# Patient Record
Sex: FEMALE | Race: WHITE | Employment: UNEMPLOYED | URBAN - METROPOLITAN AREA
[De-identification: names, ages, dates, MRNs, and addresses within clinical notes are randomized per-mention and may not be internally consistent; named-entity substitution may affect disease eponyms.]

---

## 2018-07-12 ENCOUNTER — HOSPITAL ENCOUNTER (OUTPATIENT)
Age: 28
Discharge: HOME | End: 2018-07-14
Payer: MEDICAID

## 2018-07-12 PROCEDURE — 88305 TISSUE EXAM BY PATHOLOGIST: CPT

## 2020-02-25 ENCOUNTER — APPOINTMENT (OUTPATIENT)
Dept: ULTRASOUND IMAGING | Age: 30
End: 2020-02-25
Payer: COMMERCIAL

## 2020-02-25 ENCOUNTER — HOSPITAL ENCOUNTER (EMERGENCY)
Age: 30
Discharge: ANOTHER HOSPITAL | End: 2020-02-26
Attending: EMERGENCY MEDICINE
Payer: COMMERCIAL

## 2020-02-25 ENCOUNTER — APPOINTMENT (OUTPATIENT)
Dept: CT IMAGING | Age: 30
End: 2020-02-25
Payer: COMMERCIAL

## 2020-02-25 LAB
ALBUMIN SERPL-MCNC: 3.1 G/DL (ref 3.5–5.2)
ALP BLD-CCNC: 59 U/L (ref 35–104)
ALT SERPL-CCNC: 32 U/L (ref 0–32)
ANION GAP SERPL CALCULATED.3IONS-SCNC: 12 MMOL/L (ref 7–16)
APTT: 26.9 SEC (ref 24.5–35.1)
AST SERPL-CCNC: 28 U/L (ref 0–31)
BACTERIA: ABNORMAL /HPF
BASOPHILS ABSOLUTE: 0.08 E9/L (ref 0–0.2)
BASOPHILS RELATIVE PERCENT: 0.4 % (ref 0–2)
BILIRUB SERPL-MCNC: 0.8 MG/DL (ref 0–1.2)
BILIRUBIN URINE: ABNORMAL
BLOOD, URINE: NEGATIVE
BUN BLDV-MCNC: 11 MG/DL (ref 6–20)
CALCIUM SERPL-MCNC: 7.7 MG/DL (ref 8.6–10.2)
CHLORIDE BLD-SCNC: 102 MMOL/L (ref 98–107)
CLARITY: ABNORMAL
CO2: 19 MMOL/L (ref 22–29)
COLOR: ABNORMAL
CREAT SERPL-MCNC: 0.7 MG/DL (ref 0.5–1)
D DIMER: 567 NG/ML DDU
EOSINOPHILS ABSOLUTE: 0.04 E9/L (ref 0.05–0.5)
EOSINOPHILS RELATIVE PERCENT: 0.2 % (ref 0–6)
GFR AFRICAN AMERICAN: >60
GFR NON-AFRICAN AMERICAN: >60 ML/MIN/1.73
GLUCOSE BLD-MCNC: 91 MG/DL (ref 74–99)
GLUCOSE URINE: NEGATIVE MG/DL
GONADOTROPIN, CHORIONIC (HCG) QUANT: 256.1 MIU/ML
HCG, URINE, POC: POSITIVE
HCT VFR BLD CALC: 41.7 % (ref 34–48)
HEMOGLOBIN: 13.9 G/DL (ref 11.5–15.5)
IMMATURE GRANULOCYTES #: 0.15 E9/L
IMMATURE GRANULOCYTES %: 0.7 % (ref 0–5)
INR BLD: 1.1
KETONES, URINE: 40 MG/DL
LEUKOCYTE ESTERASE, URINE: ABNORMAL
LYMPHOCYTES ABSOLUTE: 4.69 E9/L (ref 1.5–4)
LYMPHOCYTES RELATIVE PERCENT: 21.2 % (ref 20–42)
Lab: NORMAL
MCH RBC QN AUTO: 29.8 PG (ref 26–35)
MCHC RBC AUTO-ENTMCNC: 33.3 % (ref 32–34.5)
MCV RBC AUTO: 89.5 FL (ref 80–99.9)
MONOCYTES ABSOLUTE: 1.37 E9/L (ref 0.1–0.95)
MONOCYTES RELATIVE PERCENT: 6.2 % (ref 2–12)
NEGATIVE QC PASS/FAIL: NORMAL
NEUTROPHILS ABSOLUTE: 15.76 E9/L (ref 1.8–7.3)
NEUTROPHILS RELATIVE PERCENT: 71.3 % (ref 43–80)
NITRITE, URINE: NEGATIVE
PDW BLD-RTO: 12.8 FL (ref 11.5–15)
PH UA: 6 (ref 5–9)
PLATELET # BLD: 508 E9/L (ref 130–450)
PMV BLD AUTO: 8.7 FL (ref 7–12)
POSITIVE QC PASS/FAIL: NORMAL
POTASSIUM REFLEX MAGNESIUM: 4.2 MMOL/L (ref 3.5–5)
PROTEIN UA: NEGATIVE MG/DL
PROTHROMBIN TIME: 12.5 SEC (ref 9.3–12.4)
RBC # BLD: 4.66 E12/L (ref 3.5–5.5)
RBC UA: ABNORMAL /HPF (ref 0–2)
SODIUM BLD-SCNC: 133 MMOL/L (ref 132–146)
SPECIFIC GRAVITY UA: >=1.03 (ref 1–1.03)
TOTAL PROTEIN: 5.1 G/DL (ref 6.4–8.3)
TROPONIN: <0.01 NG/ML (ref 0–0.03)
UROBILINOGEN, URINE: 4 E.U./DL
WBC # BLD: 22.1 E9/L (ref 4.5–11.5)
WBC UA: ABNORMAL /HPF (ref 0–5)

## 2020-02-25 PROCEDURE — 96374 THER/PROPH/DIAG INJ IV PUSH: CPT

## 2020-02-25 PROCEDURE — 85610 PROTHROMBIN TIME: CPT

## 2020-02-25 PROCEDURE — 84702 CHORIONIC GONADOTROPIN TEST: CPT

## 2020-02-25 PROCEDURE — 36415 COLL VENOUS BLD VENIPUNCTURE: CPT

## 2020-02-25 PROCEDURE — 85378 FIBRIN DEGRADE SEMIQUANT: CPT

## 2020-02-25 PROCEDURE — 6360000002 HC RX W HCPCS: Performed by: EMERGENCY MEDICINE

## 2020-02-25 PROCEDURE — 6360000004 HC RX CONTRAST MEDICATION: Performed by: RADIOLOGY

## 2020-02-25 PROCEDURE — 85730 THROMBOPLASTIN TIME PARTIAL: CPT

## 2020-02-25 PROCEDURE — 85025 COMPLETE CBC W/AUTO DIFF WBC: CPT

## 2020-02-25 PROCEDURE — 99285 EMERGENCY DEPT VISIT HI MDM: CPT

## 2020-02-25 PROCEDURE — 81001 URINALYSIS AUTO W/SCOPE: CPT

## 2020-02-25 PROCEDURE — 71275 CT ANGIOGRAPHY CHEST: CPT

## 2020-02-25 PROCEDURE — 93005 ELECTROCARDIOGRAM TRACING: CPT | Performed by: EMERGENCY MEDICINE

## 2020-02-25 PROCEDURE — 2500000003 HC RX 250 WO HCPCS: Performed by: EMERGENCY MEDICINE

## 2020-02-25 PROCEDURE — 76700 US EXAM ABDOM COMPLETE: CPT

## 2020-02-25 PROCEDURE — 80053 COMPREHEN METABOLIC PANEL: CPT

## 2020-02-25 PROCEDURE — 96375 TX/PRO/DX INJ NEW DRUG ADDON: CPT

## 2020-02-25 PROCEDURE — 84484 ASSAY OF TROPONIN QUANT: CPT

## 2020-02-25 RX ORDER — ONDANSETRON 2 MG/ML
4 INJECTION INTRAMUSCULAR; INTRAVENOUS ONCE
Status: COMPLETED | OUTPATIENT
Start: 2020-02-25 | End: 2020-02-25

## 2020-02-25 RX ORDER — POTASSIUM CHLORIDE, DEXTROSE MONOHYDRATE AND SODIUM CHLORIDE 300; 5; 900 MG/100ML; G/100ML; MG/100ML
INJECTION, SOLUTION INTRAVENOUS CONTINUOUS
Status: DISCONTINUED | OUTPATIENT
Start: 2020-02-25 | End: 2020-02-26 | Stop reason: HOSPADM

## 2020-02-25 RX ORDER — FENTANYL CITRATE 50 UG/ML
50 INJECTION, SOLUTION INTRAMUSCULAR; INTRAVENOUS ONCE
Status: COMPLETED | OUTPATIENT
Start: 2020-02-25 | End: 2020-02-25

## 2020-02-25 RX ADMIN — FENTANYL CITRATE 50 MCG: 50 INJECTION, SOLUTION INTRAMUSCULAR; INTRAVENOUS at 22:13

## 2020-02-25 RX ADMIN — HYDROMORPHONE HYDROCHLORIDE 1 MG: 1 INJECTION, SOLUTION INTRAMUSCULAR; INTRAVENOUS; SUBCUTANEOUS at 23:55

## 2020-02-25 RX ADMIN — IOPAMIDOL 70 ML: 755 INJECTION, SOLUTION INTRAVENOUS at 20:27

## 2020-02-25 RX ADMIN — DEXTROSE MONOHYDRATE, SODIUM CHLORIDE, AND POTASSIUM CHLORIDE: 50; 9; 2.98 INJECTION, SOLUTION INTRAVENOUS at 23:42

## 2020-02-25 RX ADMIN — ONDANSETRON 4 MG: 2 INJECTION INTRAMUSCULAR; INTRAVENOUS at 22:13

## 2020-02-25 ASSESSMENT — PAIN DESCRIPTION - LOCATION: LOCATION: CHEST

## 2020-02-25 ASSESSMENT — PAIN DESCRIPTION - ONSET: ONSET: SUDDEN

## 2020-02-25 ASSESSMENT — PAIN SCALES - GENERAL
PAINLEVEL_OUTOF10: 7

## 2020-02-25 ASSESSMENT — PAIN DESCRIPTION - FREQUENCY: FREQUENCY: CONTINUOUS

## 2020-02-25 ASSESSMENT — PAIN DESCRIPTION - PAIN TYPE: TYPE: ACUTE PAIN

## 2020-02-25 ASSESSMENT — PAIN DESCRIPTION - PROGRESSION: CLINICAL_PROGRESSION: GRADUALLY IMPROVING

## 2020-02-25 ASSESSMENT — PAIN - FUNCTIONAL ASSESSMENT: PAIN_FUNCTIONAL_ASSESSMENT: PREVENTS OR INTERFERES SOME ACTIVE ACTIVITIES AND ADLS

## 2020-02-25 ASSESSMENT — PAIN DESCRIPTION - DESCRIPTORS: DESCRIPTORS: STABBING

## 2020-02-25 ASSESSMENT — PAIN DESCRIPTION - ORIENTATION: ORIENTATION: MID

## 2020-02-25 NOTE — ED PROVIDER NOTES
injection 70 mg (has no administration in time range)   iopamidol (ISOVUE-370) 76 % injection 70 mL (70 mLs Intravenous Given 2/25/20 2027)   fentaNYL (SUBLIMAZE) injection 50 mcg (50 mcg Intravenous Given 2/25/20 2213)   ondansetron (ZOFRAN) injection 4 mg (4 mg Intravenous Given 2/25/20 2213)   HYDROmorphone (DILAUDID) injection 1 mg (1 mg Intravenous Given 2/25/20 2355)         ED COURSE:       Medical Decision Making:    Pt complex hx with infertility drugs, ohss, on arrival vss, labs ordered and reviewed, imaging based off that, hcg noted, ? iup vs remant fertility drugs administered 2 weeks ago. cta and us noted, discussed with pt fertility specialist dr moise, pt requires admission for paracentesis and thoracentesis, rec lovenox as well, does not have priviledges here. Pt updated on poc, agreeable. Discussed with on call ob and hospitalist, don't feel comfortable admitting here. Will transfer to [facility], transfer center called, discussed with transfer center, dr moise accepts, will transfer to ed    hcg noted with radiology tests. Discussed with pt, concern for pe with pos d dimer, benefits outweigh risks, abd shielded to prevent fetal exposure to radiation    This patient's ED course included: a personal history and physicial examination    This patient has remained hemodynamically stable during their ED course. Re-Evaluations:             Re-evaluation. Patients symptoms show no change    Re-examination  2/25/20   5:51 PM          Vital Signs:   Vitals:    02/25/20 2115 02/25/20 2145 02/25/20 2347 02/26/20 0030   BP:  132/70 109/62 125/69   Pulse: 97 99 82 93   Resp:   16    Temp:   98.7 °F (37.1 °C)    TempSrc:   Oral    SpO2: 96% (!) 68% 97% 94%   Weight:       Height:         Card/Pulm:  Rhythm: normal rate. Heart Sounds: no murmurs, gallops, or rubs. clear to auscultation, no wheezes or rales and unlabored breathing.     Capillary Refill: normal.  Radial Pulse:  equal.  Skin: Warm.        Consultations:             Dr suma Hicks: 45 min        Counseling: The emergency provider has spoken with the patient and spouse/SO and discussed todays results, in addition to providing specific details for the plan of care and counseling regarding the diagnosis and prognosis. Questions are answered at this time and they are agreeable with the plan.       --------------------------------- IMPRESSION AND DISPOSITION ---------------------------------    IMPRESSION  1. Non-intractable vomiting with nausea, unspecified vomiting type    2. Other ascites    3. Pleural effusion    4. Chest pain, unspecified type        DISPOSITION  Disposition: Transfer to [facility] to ED  Patient condition is serious    NOTE: This report was transcribed using voice recognition software.  Every effort was made to ensure accuracy; however, inadvertent computerized transcription errors may be present        Cynthia Theodore MD  02/26/20 0105

## 2020-02-26 VITALS
WEIGHT: 165 LBS | OXYGEN SATURATION: 94 % | HEART RATE: 93 BPM | HEIGHT: 62 IN | TEMPERATURE: 98.7 F | SYSTOLIC BLOOD PRESSURE: 125 MMHG | DIASTOLIC BLOOD PRESSURE: 69 MMHG | BODY MASS INDEX: 30.36 KG/M2 | RESPIRATION RATE: 16 BRPM

## 2020-02-26 PROBLEM — N98.1 OVARIAN HYPERSTIMULATION SYNDROME: Status: ACTIVE | Noted: 2020-02-26

## 2020-02-26 LAB
EKG ATRIAL RATE: 90 BPM
EKG P-R INTERVAL: 126 MS
EKG Q-T INTERVAL: 374 MS
EKG QRS DURATION: 76 MS
EKG QTC CALCULATION (BAZETT): 457 MS
EKG R AXIS: 89 DEGREES
EKG T AXIS: 2 DEGREES
EKG VENTRICULAR RATE: 90 BPM

## 2020-02-26 PROCEDURE — 96372 THER/PROPH/DIAG INJ SC/IM: CPT

## 2020-02-26 PROCEDURE — 93010 ELECTROCARDIOGRAM REPORT: CPT | Performed by: INTERNAL MEDICINE

## 2020-02-26 PROCEDURE — 6360000002 HC RX W HCPCS: Performed by: EMERGENCY MEDICINE

## 2020-02-26 RX ADMIN — ENOXAPARIN SODIUM 70 MG: 80 INJECTION SUBCUTANEOUS at 01:27

## 2020-02-26 ASSESSMENT — PAIN DESCRIPTION - PROGRESSION: CLINICAL_PROGRESSION: GRADUALLY IMPROVING

## 2020-02-26 ASSESSMENT — PAIN SCALES - GENERAL: PAINLEVEL_OUTOF10: 3

## 2020-02-26 NOTE — ED NOTES
Pt received zofran ODT with EMS and zofran IV at the urgent care.       Genoveva Powell RN  02/25/20 8122

## 2020-02-26 NOTE — ED NOTES
@[id number] dr moise accepted the patient at [facility] department phone number [facility] Shelter  02/26/20 0021

## 2020-02-26 NOTE — ED NOTES
Per EMS pt went to Urgent Care today for dehydration. Pt has had nausea x3 weeks and emesis x3 days. Pt had zofran and 2 liters of fluid and then began experiencing chest pain and increased abdominal distention. Pt states she was seen by Dr. Brian Choudhury yesterday and told she has fluid in her abdomen d/t OHSS which she was diagnosed with 3 days prior. Dr. Brian Choudhury told pt there was not enough fluid in the abdomen to remove it and they would continue to monitor. Pt has OHSS d/t increased estrogen levels after going through invitro for 3 years. Dr. Brian Choudhury stated he thinks the last invitro was successful and pt has blood worked scheduled for tomorrow. Pt had HCG injection 13 days ago.      Radha Rashid RN  02/25/20 1949

## 2020-02-26 NOTE — ED NOTES
Report given to [facility], questions and concerns addressed. Pt departed ED on stretcher with ambulance company in no apparent distress, personal belongings taken. Family at bedside.      Shayy Forte RN  02/26/20 2040

## 2020-02-26 NOTE — ED NOTES
Nurse to nurse given to Kari Morgan RN at [facility] in [place]. Questions and concerns addressed.      Genoveva Powell RN  02/26/20 1871

## 2020-03-27 PROBLEM — O36.80X0 PREGNANCY OF UNKNOWN ANATOMIC LOCATION: Status: ACTIVE | Noted: 2020-03-27

## 2020-03-27 PROBLEM — E66.9 OBESITY (BMI 30-39.9): Status: ACTIVE | Noted: 2020-03-27

## 2020-09-30 PROBLEM — O30.131: Status: ACTIVE | Noted: 2020-09-30

## 2023-02-13 PROBLEM — Z86.32 HISTORY OF GESTATIONAL DIABETES: Status: ACTIVE | Noted: 2023-02-13

## 2023-02-13 PROBLEM — R53.83 FATIGUE: Status: ACTIVE | Noted: 2023-02-13

## 2023-02-13 PROBLEM — G89.29 CHRONIC HEADACHES: Status: ACTIVE | Noted: 2023-02-13

## 2023-02-13 PROBLEM — R10.9 ABDOMINAL PAIN: Status: ACTIVE | Noted: 2023-02-13

## 2023-02-13 PROBLEM — R51.9 CHRONIC HEADACHES: Status: ACTIVE | Noted: 2023-02-13

## 2023-02-13 RX ORDER — FAMOTIDINE 20 MG/1
1 TABLET, FILM COATED ORAL NIGHTLY
COMMUNITY

## 2024-09-03 ENCOUNTER — APPOINTMENT (OUTPATIENT)
Facility: CLINIC | Age: 34
End: 2024-09-03
Payer: COMMERCIAL

## 2024-09-03 VITALS
SYSTOLIC BLOOD PRESSURE: 101 MMHG | OXYGEN SATURATION: 99 % | DIASTOLIC BLOOD PRESSURE: 80 MMHG | HEIGHT: 63 IN | WEIGHT: 127.4 LBS | HEART RATE: 77 BPM | BODY MASS INDEX: 22.57 KG/M2

## 2024-09-03 DIAGNOSIS — K42.9 UMBILICAL HERNIA WITHOUT OBSTRUCTION AND WITHOUT GANGRENE: ICD-10-CM

## 2024-09-03 DIAGNOSIS — Z87.898 HISTORY OF WEIGHT LOSS: ICD-10-CM

## 2024-09-03 DIAGNOSIS — M62.08 DIASTASIS OF RECTUS ABDOMINIS: Primary | ICD-10-CM

## 2024-09-03 PROCEDURE — 3008F BODY MASS INDEX DOCD: CPT | Performed by: SURGERY

## 2024-09-03 PROCEDURE — 99203 OFFICE O/P NEW LOW 30 MIN: CPT | Performed by: SURGERY

## 2024-09-03 PROCEDURE — 1036F TOBACCO NON-USER: CPT | Performed by: SURGERY

## 2024-09-03 RX ORDER — CETIRIZINE HYDROCHLORIDE 10 MG/1
10 TABLET ORAL DAILY
COMMUNITY

## 2024-09-03 RX ORDER — CHOLECALCIFEROL (VITAMIN D3) 125 MCG
1 CAPSULE ORAL
COMMUNITY

## 2024-09-03 RX ORDER — SPIRONOLACTONE 25 MG/1
TABLET ORAL DAILY
COMMUNITY

## 2024-09-03 RX ORDER — FLUTICASONE FUROATE, UMECLIDINIUM BROMIDE AND VILANTEROL TRIFENATATE 100; 62.5; 25 UG/1; UG/1; UG/1
POWDER RESPIRATORY (INHALATION) DAILY
COMMUNITY

## 2024-09-03 ASSESSMENT — ENCOUNTER SYMPTOMS
DYSURIA: 0
FEVER: 0
WOUND: 0
NAUSEA: 0
MYALGIAS: 0
FLANK PAIN: 0
CONSTIPATION: 0
AGITATION: 0
SPEECH DIFFICULTY: 0
EYE REDNESS: 0
POLYPHAGIA: 0
SHORTNESS OF BREATH: 0
EYE PAIN: 0
VOMITING: 0
BRUISES/BLEEDS EASILY: 0
ABDOMINAL PAIN: 1
CONFUSION: 0
COUGH: 0
DIARRHEA: 0
ARTHRALGIAS: 0
HEADACHES: 0
HEMATURIA: 0
FREQUENCY: 0
WEAKNESS: 0
CHILLS: 0
FATIGUE: 0

## 2024-09-03 NOTE — PATIENT INSTRUCTIONS
"1.  A referral has been made to Dr. Vang (plastic surgery) to discuss an abdominoplasty surgery.  Most likely, he would repair the \"tiny umbilical hernia\" seen on your CT evaluation at the time of the abdominoplasty surgery.  2.  If you have any other questions or concerns regarding the small hernia by your bellybutton, please call Dr. Narvaez's office.  [phone]  "

## 2024-09-03 NOTE — PROGRESS NOTES
"    GENERAL SURGERY OFFICE NOTE    Patient: Hollie Horton    Age: 33 y.o.   Gender: female    MRN: [id number]    PCP: Mily Eric MD        SUBJECTIVE     Chief Complaint  New Patient Visit (Patient is a self referral for possible abdominal hernia. Patient states that she had been going to the [facility] and was told that she has an abdominal hernia but was not told excatly where. Patient states that she gets severe pain on the right side of her abdomin. Patient states that she is always nauseas. Patient states that she has diastasis recti and would like a referral to plastic surgery.)       BRENDA Browne is a 33-year-old white female who referred herself to the office for evaluation of an umbilical hernia.  She has a longstanding history of chronic abdominal issues including epigastric and right upper quadrant pain.  She went through a workup through the [facility] and was felt to have celiac disease which was diagnosed by gastric biopsies during EGD.  She reports that during that workup of her abdominal pain, she did undergo a CT evaluation of her abdomen/pelvis in January 2024.  (The pictures are not available, but the patient did have a copy of the CT report.) the CT demonstrated a \"tiny umbilical hernia with omentum\".  She has noticed a slight bulge in the epigastric region which was first noticed about 4 years ago when she had her triplets.  She reports a weight loss of nearly 100 pounds since delivery of the triplets.  Her \"top weight\" was 223 pounds during her pregnancy.  She is requesting referral to plastic surgery for an abdominoplasty.    ROS  Review of Systems   Constitutional:  Negative for chills, fatigue and fever.   HENT:  Negative for congestion, ear pain and hearing loss.    Eyes:  Negative for pain and redness.   Respiratory:  Negative for cough and shortness of breath.    Cardiovascular:  Negative for chest pain and leg swelling.   Gastrointestinal:  Positive for " "abdominal pain. Negative for constipation, diarrhea, nausea and vomiting.   Endocrine: Negative for polyphagia.   Genitourinary:  Negative for dysuria, flank pain, frequency and hematuria.   Musculoskeletal:  Negative for arthralgias and myalgias.   Skin:  Negative for rash and wound.   Allergic/Immunologic: Negative for immunocompromised state.   Neurological:  Negative for speech difficulty, weakness and headaches.   Hematological:  Does not bruise/bleed easily.   Psychiatric/Behavioral:  Negative for agitation and confusion.           HISTORY     Past Medical History:   Diagnosis Date    Allergies     Celiac disease ([facility])         Past Surgical History:   Procedure Laterality Date    ADENOIDECTOMY      APPENDECTOMY      BLADDER SUSPENSION      uretha sling     SECTION, CLASSIC  2020    TONSILLECTOMY          Family History   Problem Relation Name Age of Onset    Hypertension Mother      Colon cancer Mother's Brother      Pancreatic cancer Maternal Grandfather          Allergies   Allergen Reactions    Toradol [Ketorolac] Unknown    Vicodin [Hydrocodone-Acetaminophen] Unknown        Social History     Tobacco Use   Smoking Status Never   Smokeless Tobacco Never        Social History     Substance and Sexual Activity   Alcohol Use Never        HOME MEDICATIONS  Current Outpatient Medications   Medication Instructions    biotin 10,000 mcg tablet,disintegrating 1 tablet, oral, Daily RT    cetirizine (ZYRTEC) 10 mg, oral, Daily    fluticasone-umeclidin-vilanter (Trelegy Ellipta) 100-62.5-25 mcg blister with device inhalation, Daily    spironolactone (Aldactone) 25 mg tablet oral, Daily          OBJECTIVE   Last Recorded Vitals.  Blood pressure 101/80, pulse 77, height 1.588 m (5' 2.5\"), weight 57.8 kg (127 lb 6.4 oz), SpO2 99%.     PHYSICAL EXAM  Physical Exam   General: Well-developed, well-nourished and in no acute distress.  Head: Normocephalic. Atraumatic.  Neck/thyroid: Neck is supple. " "  Eyes: Pupils equal round and reactive to light. Conjunctiva normal.  ENMT: No masses or deformity of external nose. External ears without masses.  Respiratory/Chest:  Normal respiratory effort.  Cardiovascular: Regular rate and rhythm.   Abdomen: Soft, nontender, nondistended.  Very loose skin and soft tissue consistent with weight loss.  At the base of the umbilicus, there is a less than 1 x 1 cm fascial defect with reducible contents.  Musculoskeletal: Joints and limbs are grossly normal. Normal gait. Normal range of motion of major joints.  Neuro: Oriented to person, place and time. No obvious neurological deficit. Motor strength grossly normal.  Psych: Normal mood and affect.    RESULTS   Labs  No results found for this or any previous visit (from the past 24 hour(s)).    Radiology Resutls  Review of the CT report from the [facility] on the patient's phone showed a \"tiny umbilical hernia with omentum\".  No size of the fascial defect.      ASSESSMENT / PLAN   Diagnoses and all orders for this visit:  Diastasis of rectus abdominis  -     Referral to Plastic Surgery; Future  Umbilical hernia without obstruction and without gangrene  History of weight loss  -     Referral to Plastic Surgery; Future      Plan  1.  Reviewed with the patient that the \"tiny umbilical hernia with omentum\" is extremely small and less than a fingertip sized fascial defect on physical exam.  This does not require mesh repair.  This hernia most likely would be automatically repaired during an abdominoplasty surgery.  2.  Referral made to plastic surgery for abdominoplasty given her 100 pound weight loss since delivery of her triplets with associated diastasis recti and excessive loose abdominal skin.      Aimee Narvaez MD, FACS  [facility]  [address] [place] [address];   [address]  [place]  [address] [phone]  "